# Patient Record
Sex: FEMALE | URBAN - METROPOLITAN AREA
[De-identification: names, ages, dates, MRNs, and addresses within clinical notes are randomized per-mention and may not be internally consistent; named-entity substitution may affect disease eponyms.]

---

## 2017-04-07 ENCOUNTER — HOSPITAL ENCOUNTER (OUTPATIENT)
Dept: CT IMAGING | Age: 67
Discharge: HOME | End: 2017-04-07

## 2017-04-07 VITALS — SYSTOLIC BLOOD PRESSURE: 153 MMHG | DIASTOLIC BLOOD PRESSURE: 75 MMHG | HEART RATE: 94 BPM

## 2017-04-07 DIAGNOSIS — Z13.9 SCREENING FOR CONDITION: ICD-10-CM

## 2017-04-07 NOTE — IMAGING NOTE
Cholesterol/Glucose screening not performed. Mrs. Derrick Ahumada declined, reported that she did not fast.  Preliminary results for CT Calcium Score provided:0.00. Risks and findings discussed with Mrs. Derrick Ahumada who verbalized understanding.   Instructed to follow-up